# Patient Record
Sex: FEMALE | Race: WHITE | NOT HISPANIC OR LATINO
[De-identification: names, ages, dates, MRNs, and addresses within clinical notes are randomized per-mention and may not be internally consistent; named-entity substitution may affect disease eponyms.]

---

## 2019-08-12 ENCOUNTER — APPOINTMENT (OUTPATIENT)
Dept: ANTEPARTUM | Facility: CLINIC | Age: 24
End: 2019-08-12
Payer: MEDICAID

## 2019-08-12 ENCOUNTER — ASOB RESULT (OUTPATIENT)
Age: 24
End: 2019-08-12

## 2019-08-12 PROCEDURE — 76817 TRANSVAGINAL US OBSTETRIC: CPT

## 2019-08-12 PROCEDURE — 76811 OB US DETAILED SNGL FETUS: CPT

## 2019-09-01 ENCOUNTER — OUTPATIENT (OUTPATIENT)
Dept: OUTPATIENT SERVICES | Facility: HOSPITAL | Age: 24
LOS: 1 days | Discharge: HOME | End: 2019-09-01

## 2019-09-01 VITALS
HEART RATE: 75 BPM | RESPIRATION RATE: 17 BRPM | SYSTOLIC BLOOD PRESSURE: 100 MMHG | TEMPERATURE: 98 F | DIASTOLIC BLOOD PRESSURE: 59 MMHG

## 2019-09-01 VITALS — TEMPERATURE: 98 F

## 2019-09-01 LAB
APPEARANCE UR: ABNORMAL
BILIRUB UR-MCNC: NEGATIVE — SIGNIFICANT CHANGE UP
COLOR SPEC: YELLOW — SIGNIFICANT CHANGE UP
DIFF PNL FLD: SIGNIFICANT CHANGE UP
GLUCOSE UR QL: SIGNIFICANT CHANGE UP
KETONES UR-MCNC: NEGATIVE — SIGNIFICANT CHANGE UP
LEUKOCYTE ESTERASE UR-ACNC: ABNORMAL
NITRITE UR-MCNC: NEGATIVE — SIGNIFICANT CHANGE UP
PH UR: 6.5 — SIGNIFICANT CHANGE UP (ref 5–8)
PROT UR-MCNC: ABNORMAL
SP GR SPEC: 1.02 — SIGNIFICANT CHANGE UP (ref 1.01–1.02)
UROBILINOGEN FLD QL: SIGNIFICANT CHANGE UP

## 2019-09-01 NOTE — OB PROVIDER TRIAGE NOTE - NSHPPHYSICALEXAM_GEN_ALL_CORE
Vital Signs Last 24 Hrs  T(F): 98 (01 Sep 2019 21:43), Max: 98.5 (01 Sep 2019 21:35)  HR: 75 (01 Sep 2019 21:43) (75 - 75)  BP: 100/59 (01 Sep 2019 21:43) (100/59 - 100/59)  RR: 17 (01 Sep 2019 21:43) (17 - 17)    udip: trace blood, trace protein, large leuk    gen: AAOx3, nad  EFM: 140/mod maria victoria/+accel  toco: none  SVE: C/L/P  speculum: no discharge or bleeding  abd: soft, nontender, gravid, no palpable contractions, no CVA tenderness  TVUS:  cvx 3.02cm  TAUS: FHR 153bpm, breech, ant placenta, MVP 4.52cm

## 2019-09-01 NOTE — OB PROVIDER TRIAGE NOTE - NSOBPROVIDERNOTE_OBGYN_ALL_OB_FT
23 yo  @23w4d, GBS unknown, w/ likely uncomplicated UTI, not in  labor, reassuring fetal and maternal status, currently clinically and hemodynamically stable.    - d/c to home  - f/up at next scheduled appt with PMD  - return to L&D if febrile or significant low back pain  - c/w cefdinir as prescribed  - f/up UA, Ucx  -  labor precautions  - maternal hydration encouraged    Dr. Mcpherson and Dr. Ogden aware.

## 2019-09-01 NOTE — OB PROVIDER TRIAGE NOTE - HISTORY OF PRESENT ILLNESS
25 yo  @23w4d w/ EDC 19 by LMP and c/w 1st tri sono presents to L&D with c/o UTI symptoms. Pt has had urgency, frequency and dysuria for the past 2 days. Also reports mild diffuse, aching, intermittent low back pain since this time, 4/10 intensity. Nothing makes better or worse, did not take any meds. Has also had suprapubic tenderness. Pt given prescription for cefdinir today, 300mg BID [id number]. Has taken 1 dose. Denies fever, chills, CP, SOB, N/V, constipation/diarrhea, hematuria, vaginal discharge. Denies ctx, LOF, VB. Last PO pizza earlier today. Last BM 3 days ago, normal for pt. No intercourse >24hrs. No fetal movements yet. Otherwise no complications during this pregnancy. GBS unknown

## 2019-09-01 NOTE — OB RN TRIAGE NOTE - NSNURSINGINSTR_OBGYN_ALL_OB_FT
pt educated on pre term labor instructions; instructed to completely finish antibiotics she is currently taking at home; verbalized understanding

## 2019-09-01 NOTE — OB PROVIDER TRIAGE NOTE - ADDITIONAL INSTRUCTIONS
- f/up at next scheduled appt with PMD  - return to L&D if febrile, significant low back pain  - c/w cefdinir as prescribed  -  labor precautions  - maternal hydration encouraged

## 2019-09-01 NOTE — OB PROVIDER TRIAGE NOTE - NSHPLABSRESULTS_GEN_ALL_CORE
Labs  5/7/19  HepB: NR  RPR: NR  measles: non-immune  varicella: immune  rubella: non-immune  mumps: non-immune  blood type: O pos  Ab screen: neg  HIV: NR  Ucx: neg    Sonos:  no PNC

## 2019-09-02 LAB
BACTERIA # UR AUTO: ABNORMAL
EPI CELLS # UR: 6 /HPF — HIGH (ref 0–5)
HYALINE CASTS # UR AUTO: 2 /LPF — SIGNIFICANT CHANGE UP (ref 0–7)
RBC CASTS # UR COMP ASSIST: 6 /HPF — HIGH (ref 0–4)
WBC UR QL: 409 /HPF — HIGH (ref 0–5)

## 2019-09-03 LAB
CULTURE RESULTS: NO GROWTH — SIGNIFICANT CHANGE UP
SPECIMEN SOURCE: SIGNIFICANT CHANGE UP

## 2019-10-23 ENCOUNTER — ASOB RESULT (OUTPATIENT)
Age: 24
End: 2019-10-23

## 2019-10-23 ENCOUNTER — APPOINTMENT (OUTPATIENT)
Dept: ANTEPARTUM | Facility: CLINIC | Age: 24
End: 2019-10-23
Payer: MEDICAID

## 2019-10-23 PROCEDURE — 76816 OB US FOLLOW-UP PER FETUS: CPT

## 2019-11-25 ENCOUNTER — ASOB RESULT (OUTPATIENT)
Age: 24
End: 2019-11-25

## 2019-11-25 ENCOUNTER — APPOINTMENT (OUTPATIENT)
Dept: ANTEPARTUM | Facility: CLINIC | Age: 24
End: 2019-11-25
Payer: MEDICAID

## 2019-11-25 PROCEDURE — 76818 FETAL BIOPHYS PROFILE W/NST: CPT

## 2019-11-25 PROCEDURE — 76816 OB US FOLLOW-UP PER FETUS: CPT

## 2019-12-16 ENCOUNTER — ASOB RESULT (OUTPATIENT)
Age: 24
End: 2019-12-16

## 2019-12-16 ENCOUNTER — APPOINTMENT (OUTPATIENT)
Dept: ANTEPARTUM | Facility: CLINIC | Age: 24
End: 2019-12-16
Payer: MEDICAID

## 2019-12-16 PROCEDURE — 76816 OB US FOLLOW-UP PER FETUS: CPT

## 2019-12-21 ENCOUNTER — INPATIENT (INPATIENT)
Facility: HOSPITAL | Age: 24
LOS: 2 days | Discharge: HOME | End: 2019-12-24
Attending: OBSTETRICS & GYNECOLOGY | Admitting: OBSTETRICS & GYNECOLOGY
Payer: MEDICAID

## 2019-12-21 VITALS — TEMPERATURE: 98 F | DIASTOLIC BLOOD PRESSURE: 77 MMHG | SYSTOLIC BLOOD PRESSURE: 114 MMHG | HEART RATE: 72 BPM

## 2019-12-21 LAB
AMPHET UR-MCNC: NEGATIVE — SIGNIFICANT CHANGE UP
APPEARANCE UR: ABNORMAL
BACTERIA # UR AUTO: ABNORMAL
BARBITURATES UR SCN-MCNC: NEGATIVE — SIGNIFICANT CHANGE UP
BASOPHILS # BLD AUTO: 0.02 K/UL — SIGNIFICANT CHANGE UP (ref 0–0.2)
BASOPHILS NFR BLD AUTO: 0.2 % — SIGNIFICANT CHANGE UP (ref 0–1)
BENZODIAZ UR-MCNC: NEGATIVE — SIGNIFICANT CHANGE UP
BILIRUB UR-MCNC: NEGATIVE — SIGNIFICANT CHANGE UP
BLD GP AB SCN SERPL QL: SIGNIFICANT CHANGE UP
BUPRENORPHINE SCREEN, URINE RESULT: NEGATIVE — SIGNIFICANT CHANGE UP
COCAINE METAB.OTHER UR-MCNC: NEGATIVE — SIGNIFICANT CHANGE UP
COLOR SPEC: SIGNIFICANT CHANGE UP
DIFF PNL FLD: ABNORMAL
EOSINOPHIL # BLD AUTO: 0.16 K/UL — SIGNIFICANT CHANGE UP (ref 0–0.7)
EOSINOPHIL NFR BLD AUTO: 1.9 % — SIGNIFICANT CHANGE UP (ref 0–8)
EPI CELLS # UR: 5 /HPF — SIGNIFICANT CHANGE UP (ref 0–5)
FENTANYL UR QL: NEGATIVE — SIGNIFICANT CHANGE UP
GLUCOSE UR QL: NEGATIVE — SIGNIFICANT CHANGE UP
HCT VFR BLD CALC: 37.6 % — SIGNIFICANT CHANGE UP (ref 37–47)
HGB BLD-MCNC: 12.6 G/DL — SIGNIFICANT CHANGE UP (ref 12–16)
HYALINE CASTS # UR AUTO: 2 /LPF — SIGNIFICANT CHANGE UP (ref 0–7)
IMM GRANULOCYTES NFR BLD AUTO: 0.8 % — HIGH (ref 0.1–0.3)
KETONES UR-MCNC: NEGATIVE — SIGNIFICANT CHANGE UP
L&D DRUG SCREEN, URINE: SIGNIFICANT CHANGE UP
LEUKOCYTE ESTERASE UR-ACNC: ABNORMAL
LYMPHOCYTES # BLD AUTO: 1.26 K/UL — SIGNIFICANT CHANGE UP (ref 1.2–3.4)
LYMPHOCYTES # BLD AUTO: 14.7 % — LOW (ref 20.5–51.1)
MCHC RBC-ENTMCNC: 29.9 PG — SIGNIFICANT CHANGE UP (ref 27–31)
MCHC RBC-ENTMCNC: 33.5 G/DL — SIGNIFICANT CHANGE UP (ref 32–37)
MCV RBC AUTO: 89.1 FL — SIGNIFICANT CHANGE UP (ref 81–99)
METHADONE UR-MCNC: NEGATIVE — SIGNIFICANT CHANGE UP
MONOCYTES # BLD AUTO: 0.56 K/UL — SIGNIFICANT CHANGE UP (ref 0.1–0.6)
MONOCYTES NFR BLD AUTO: 6.5 % — SIGNIFICANT CHANGE UP (ref 1.7–9.3)
NEUTROPHILS # BLD AUTO: 6.52 K/UL — HIGH (ref 1.4–6.5)
NEUTROPHILS NFR BLD AUTO: 75.9 % — HIGH (ref 42.2–75.2)
NITRITE UR-MCNC: NEGATIVE — SIGNIFICANT CHANGE UP
NRBC # BLD: 0 /100 WBCS — SIGNIFICANT CHANGE UP (ref 0–0)
OPIATES UR-MCNC: NEGATIVE — SIGNIFICANT CHANGE UP
OXYCODONE UR-MCNC: NEGATIVE — SIGNIFICANT CHANGE UP
PCP UR-MCNC: NEGATIVE — SIGNIFICANT CHANGE UP
PH UR: 7.5 — SIGNIFICANT CHANGE UP (ref 5–8)
PLATELET # BLD AUTO: 179 K/UL — SIGNIFICANT CHANGE UP (ref 130–400)
PRENATAL SYPHILIS TEST: SIGNIFICANT CHANGE UP
PROPOXYPHENE QUALITATIVE URINE RESULT: NEGATIVE — SIGNIFICANT CHANGE UP
PROT UR-MCNC: NEGATIVE — SIGNIFICANT CHANGE UP
RBC # BLD: 4.22 M/UL — SIGNIFICANT CHANGE UP (ref 4.2–5.4)
RBC # FLD: 14.2 % — SIGNIFICANT CHANGE UP (ref 11.5–14.5)
RBC CASTS # UR COMP ASSIST: 6 /HPF — HIGH (ref 0–4)
SP GR SPEC: 1.01 — LOW (ref 1.01–1.02)
UROBILINOGEN FLD QL: SIGNIFICANT CHANGE UP
WBC # BLD: 8.59 K/UL — SIGNIFICANT CHANGE UP (ref 4.8–10.8)
WBC # FLD AUTO: 8.59 K/UL — SIGNIFICANT CHANGE UP (ref 4.8–10.8)
WBC UR QL: 34 /HPF — HIGH (ref 0–5)

## 2019-12-21 PROCEDURE — 59400 OBSTETRICAL CARE: CPT | Mod: U9

## 2019-12-21 RX ORDER — BUPIVACAINE HCL/PF 7.5 MG/ML
250 VIAL (ML) INJECTION
Refills: 0 | Status: DISCONTINUED | OUTPATIENT
Start: 2019-12-21 | End: 2019-12-21

## 2019-12-21 RX ORDER — KETOROLAC TROMETHAMINE 30 MG/ML
30 SYRINGE (ML) INJECTION ONCE
Refills: 0 | Status: DISCONTINUED | OUTPATIENT
Start: 2019-12-21 | End: 2019-12-21

## 2019-12-21 RX ORDER — SODIUM CHLORIDE 9 MG/ML
3 INJECTION INTRAMUSCULAR; INTRAVENOUS; SUBCUTANEOUS EVERY 8 HOURS
Refills: 0 | Status: DISCONTINUED | OUTPATIENT
Start: 2019-12-21 | End: 2019-12-24

## 2019-12-21 RX ORDER — OXYCODONE HYDROCHLORIDE 5 MG/1
5 TABLET ORAL ONCE
Refills: 0 | Status: DISCONTINUED | OUTPATIENT
Start: 2019-12-21 | End: 2019-12-24

## 2019-12-21 RX ORDER — GLYCERIN ADULT
1 SUPPOSITORY, RECTAL RECTAL AT BEDTIME
Refills: 0 | Status: DISCONTINUED | OUTPATIENT
Start: 2019-12-21 | End: 2019-12-24

## 2019-12-21 RX ORDER — OXYCODONE HYDROCHLORIDE 5 MG/1
5 TABLET ORAL
Refills: 0 | Status: DISCONTINUED | OUTPATIENT
Start: 2019-12-21 | End: 2019-12-24

## 2019-12-21 RX ORDER — MAGNESIUM HYDROXIDE 400 MG/1
30 TABLET, CHEWABLE ORAL
Refills: 0 | Status: DISCONTINUED | OUTPATIENT
Start: 2019-12-21 | End: 2019-12-24

## 2019-12-21 RX ORDER — NALOXONE HYDROCHLORIDE 4 MG/.1ML
0.1 SPRAY NASAL
Refills: 0 | Status: DISCONTINUED | OUTPATIENT
Start: 2019-12-21 | End: 2019-12-21

## 2019-12-21 RX ORDER — OXYTOCIN 10 UNIT/ML
333.33 VIAL (ML) INJECTION
Qty: 20 | Refills: 0 | Status: DISCONTINUED | OUTPATIENT
Start: 2019-12-21 | End: 2019-12-24

## 2019-12-21 RX ORDER — OXYTOCIN 10 UNIT/ML
333.33 VIAL (ML) INJECTION
Qty: 20 | Refills: 0 | Status: DISCONTINUED | OUTPATIENT
Start: 2019-12-21 | End: 2019-12-21

## 2019-12-21 RX ORDER — DIPHENHYDRAMINE HCL 50 MG
25 CAPSULE ORAL EVERY 6 HOURS
Refills: 0 | Status: DISCONTINUED | OUTPATIENT
Start: 2019-12-21 | End: 2019-12-24

## 2019-12-21 RX ORDER — ONDANSETRON 8 MG/1
4 TABLET, FILM COATED ORAL EVERY 6 HOURS
Refills: 0 | Status: DISCONTINUED | OUTPATIENT
Start: 2019-12-21 | End: 2019-12-21

## 2019-12-21 RX ORDER — AER TRAVELER 0.5 G/1
1 SOLUTION RECTAL; TOPICAL EVERY 4 HOURS
Refills: 0 | Status: DISCONTINUED | OUTPATIENT
Start: 2019-12-21 | End: 2019-12-24

## 2019-12-21 RX ORDER — ACETAMINOPHEN 500 MG
975 TABLET ORAL
Refills: 0 | Status: DISCONTINUED | OUTPATIENT
Start: 2019-12-21 | End: 2019-12-24

## 2019-12-21 RX ORDER — DIBUCAINE 1 %
1 OINTMENT (GRAM) RECTAL EVERY 6 HOURS
Refills: 0 | Status: DISCONTINUED | OUTPATIENT
Start: 2019-12-21 | End: 2019-12-24

## 2019-12-21 RX ORDER — BENZOCAINE 10 %
1 GEL (GRAM) MUCOUS MEMBRANE EVERY 6 HOURS
Refills: 0 | Status: DISCONTINUED | OUTPATIENT
Start: 2019-12-21 | End: 2019-12-24

## 2019-12-21 RX ORDER — CITRIC ACID/SODIUM CITRATE 300-500 MG
15 SOLUTION, ORAL ORAL EVERY 6 HOURS
Refills: 0 | Status: DISCONTINUED | OUTPATIENT
Start: 2019-12-21 | End: 2019-12-21

## 2019-12-21 RX ORDER — IBUPROFEN 200 MG
600 TABLET ORAL EVERY 6 HOURS
Refills: 0 | Status: COMPLETED | OUTPATIENT
Start: 2019-12-21 | End: 2020-11-18

## 2019-12-21 RX ORDER — SODIUM CHLORIDE 9 MG/ML
1000 INJECTION, SOLUTION INTRAVENOUS
Refills: 0 | Status: DISCONTINUED | OUTPATIENT
Start: 2019-12-21 | End: 2019-12-21

## 2019-12-21 RX ORDER — PRAMOXINE HYDROCHLORIDE 150 MG/15G
1 AEROSOL, FOAM RECTAL EVERY 4 HOURS
Refills: 0 | Status: DISCONTINUED | OUTPATIENT
Start: 2019-12-21 | End: 2019-12-24

## 2019-12-21 RX ORDER — HYDROCORTISONE 1 %
1 OINTMENT (GRAM) TOPICAL EVERY 6 HOURS
Refills: 0 | Status: DISCONTINUED | OUTPATIENT
Start: 2019-12-21 | End: 2019-12-24

## 2019-12-21 RX ORDER — SIMETHICONE 80 MG/1
80 TABLET, CHEWABLE ORAL EVERY 4 HOURS
Refills: 0 | Status: DISCONTINUED | OUTPATIENT
Start: 2019-12-21 | End: 2019-12-24

## 2019-12-21 RX ORDER — DEXAMETHASONE 0.5 MG/5ML
4 ELIXIR ORAL EVERY 6 HOURS
Refills: 0 | Status: DISCONTINUED | OUTPATIENT
Start: 2019-12-21 | End: 2019-12-21

## 2019-12-21 RX ORDER — LANOLIN
1 OINTMENT (GRAM) TOPICAL EVERY 6 HOURS
Refills: 0 | Status: DISCONTINUED | OUTPATIENT
Start: 2019-12-21 | End: 2019-12-24

## 2019-12-21 RX ADMIN — Medication 30 MILLIGRAM(S): at 23:43

## 2019-12-21 NOTE — OB PROVIDER H&P - ALERT: PERTINENT HISTORY
1st Trimester Sonogram/20 Week Level II Sonogram/BioPhysical Profile(s)/Follow up Sonogram for Growth

## 2019-12-21 NOTE — OB RN PATIENT PROFILE - ALERT: PERTINENT HISTORY
20 Week Level II Sonogram/1st Trimester Sonogram/BioPhysical Profile(s)/Follow up Sonogram for Growth

## 2019-12-21 NOTE — PROGRESS NOTE ADULT - ASSESSMENT
A/P:  25yo  @39w3d, GBS neg, IUGR, with FH of provoked DVT, clotting profile wnl, with anemia on PO iron, MMR nonimmune, in labor, s/p AROM, with cat II tracing  -cont EFM/toco  -clear liquid diet, IVF  -continue resuscitation with O2, IVF bolus, LLP   -pain management with epidural  -vitals per routine      Dr. Mckee and Dr. White aware.

## 2019-12-21 NOTE — PROGRESS NOTE ADULT - ASSESSMENT
A/P:  23yo  @39w3d, GBS neg, IUGR, with FH of provoked DVT, clotting profile wnl, with anemia on PO iron, MMR nonimmune, in labor, s/p epidural, s/p AROM, patient pushing  -cont EFM/toco  -clear liquid diet, IVF  -continue resuscitation with O2, IVF bolus, LLP prn  -pain management with epidural  -vitals per routine  -anticipate vaginal delivery      Dr. Mckee and Dr. White aware.

## 2019-12-21 NOTE — PROGRESS NOTE ADULT - SUBJECTIVE AND OBJECTIVE BOX
PGY 1 Note    Patient seen at bedside for evaluation of labor progression.  Feels pressure, currently pushing but feeling tired. With intermittent variable decelerations, down to 60bpm, recovering spontaneously. Denies headache, changes in vision, chest pain, SOB, RUQ/epigastric pain, N/V, fevers, chills, diarrhea, LE pain/swelling.     Vital Signs Last 24 Hrs  T(C): 36.5 (21 Dec 2019 13:08), Max: 36.5 (21 Dec 2019 12:43)  T(F): 97.7 (21 Dec 2019 13:08), Max: 97.7 (21 Dec 2019 12:43)  HR: 83 (21 Dec 2019 21:52) (62 - 111)  BP: 115/57 (21 Dec 2019 21:30) (98/57 - 162/117) 162/117 (while pushing) --> 115/57  RR: 18 (21 Dec 2019 13:08) (18 - 18)  SpO2: 99% (21 Dec 2019 21:52) (82% - 100%)    EFM: 140/mod/+accel/+intermittent variable, down to 60bpm, cat II  TOCO: q2m  SVE: 10/100/+1    Labs:                        12.6   8.59  )-----------( 179      ( 21 Dec 2019 13:10 )             37.6           ABO RH Interpretation: O POS (19 @ 13:10)  antibody neg  RPR NR  UDS neg    Urinalysis Basic - ( 21 Dec 2019 13:35 )    Color: Light Yellow / Appearance: Slightly Turbid / S.007 / pH: x  Gluc: x / Ketone: Negative  / Bili: Negative / Urobili: <2 mg/dL   Blood: x / Protein: Negative / Nitrite: Negative   Leuk Esterase: Moderate / RBC: 6 /HPF / WBC 34 /HPF   Sq Epi: x / Non Sq Epi: 5 /HPF / Bacteria: Few          Meds: BUpivacaine 0.1% in 0.9% Sodium Chloride PCEA 250 milliLiter(s) Epidural PCA Continuous, started @1352  citric acid/sodium citrate Solution 15 milliLiter(s) Oral every 6 hours

## 2019-12-21 NOTE — PROGRESS NOTE ADULT - SUBJECTIVE AND OBJECTIVE BOX
PGY 1 Note    Patient seen at bedside for evaluation of labor progression. Comfortable s/p epidural.  AROM clear @1412.    Vital Signs Last 24 Hrs  T(C): 36.5 (21 Dec 2019 13:08), Max: 36.5 (21 Dec 2019 12:43)  T(F): 97.7 (21 Dec 2019 13:08), Max: 97.7 (21 Dec 2019 12:43)  HR: 80 (21 Dec 2019 14:57) (64 - 101)  BP: 101/65 (21 Dec 2019 14:57) (101/65 - 122/79)  RR: 18 (21 Dec 2019 13:08) (18 - 18)  SpO2: 100% (21 Dec 2019 15:01) (99% - 100%)    EFM: 120/mod/+accel, cat I  TOCO: q2m  SVE: 590/-1 @1412 per Dr. White    Labs:                        12.6   8.59  )-----------( 179      ( 21 Dec 2019 13:10 )             37.6         ABO RH Interpretation: O POS (19 @ 13:10)  antibody neg    Urinalysis Basic - ( 21 Dec 2019 13:35 )    Color: Light Yellow / Appearance: Slightly Turbid / S.007 / pH: x  Gluc: x / Ketone: Negative  / Bili: Negative / Urobili: <2 mg/dL   Blood: x / Protein: Negative / Nitrite: Negative   Leuk Esterase: Moderate / RBC: 6 /HPF / WBC 34 /HPF   Sq Epi: x / Non Sq Epi: 5 /HPF / Bacteria: Few      Meds: BUpivacaine 0.1% in 0.9% Sodium Chloride PCEA 250 milliLiter(s) Epidural PCA Continuous, @1352  citric acid/sodium citrate Solution 15 milliLiter(s) Oral every 6 hours

## 2019-12-21 NOTE — PROGRESS NOTE ADULT - SUBJECTIVE AND OBJECTIVE BOX
PGY 1 Note    Patient seen at bedside for evaluation of labor progression. Comfortable s/p epidural.  EFM with late decel x3, longest lasting 3 min, down to 60bpm, recovering spontaneously.  Resuscitated with LLP, IV fluid bolus, O2 therapy and scalp stimulation.  With intermittent variable decelerations, down to 90bpm, recovering spontaneously.  Patient denies any complaints at this time.    Vital Signs Last 24 Hrs  T(C): 36.5 (21 Dec 2019 13:08), Max: 36.5 (21 Dec 2019 12:43)  T(F): 97.7 (21 Dec 2019 13:08), Max: 97.7 (21 Dec 2019 12:43)  HR: 78 (21 Dec 2019 18:21) (62 - 108)  BP: 130/68 (21 Dec 2019 18:14) (98/57 - 130/68)  RR: 18 (21 Dec 2019 13:08) (18 - 18)  SpO2: 100% (21 Dec 2019 18:21) (82% - 100%)    EFM: 120/mod/+accel/+late decel x3/+intermittent variable decels, cat II  TOCO: q2m  SVE: 7/100/0 @1813    Labs:                        12.6   8.59  )-----------( 179      ( 21 Dec 2019 13:10 )             37.6           ABO RH Interpretation: O POS (19 @ 13:10)  antibody neg  RPR NR  UDS neg    Urinalysis Basic - ( 21 Dec 2019 13:35 )    Color: Light Yellow / Appearance: Slightly Turbid / S.007 / pH: x  Gluc: x / Ketone: Negative  / Bili: Negative / Urobili: <2 mg/dL   Blood: x / Protein: Negative / Nitrite: Negative   Leuk Esterase: Moderate / RBC: 6 /HPF / WBC 34 /HPF   Sq Epi: x / Non Sq Epi: 5 /HPF / Bacteria: Few      Meds: BUpivacaine 0.1% in 0.9% Sodium Chloride PCEA 250 milliLiter(s) Epidural PCA Continuous, started @1352  citric acid/sodium citrate Solution 15 milliLiter(s) Oral every 6 hours

## 2019-12-21 NOTE — PROGRESS NOTE ADULT - ASSESSMENT
A/P:  25yo  @39w3d, GBS neg, IUGR, with FH of provoked DVT, clotting profile wnl, with anemia on PO iron, MMR nonimmune, in labor, s/p AROM   -cont efm/toco  -clear liquid diet, IVF  -monitor vitals per routine  -MMR postpartum  -f/u UDS    Dr. Mckee and Dr. White aware.

## 2019-12-21 NOTE — PROGRESS NOTE ADULT - ASSESSMENT
A/P:  25yo  @39w3d, GBS neg, IUGR, with FH of provoked DVT, clotting profile wnl, with anemia on PO iron, MMR nonimmune, in labor, s/p AROM   -cont EFM/toco  -clear liquid diet, IVF  -pain management with epidural  -f/u UDS    Dr. Mckee and Dr. White aware.

## 2019-12-21 NOTE — OB PROVIDER H&P - NSHPLABSRESULTS_GEN_ALL_CORE
Labs:  5/7  measles nonimmune  mumps nonimmune  Lead <1  varicella immune    6/4  cardiolipin IgA, IgG, IgM neg  factor V leidun neg  prothrombin neg    10/10      10/24  GTT 71/107/71/90    Sono:  8/12: post placenta, normal anatomy, AGA  10/10: 29w1d, size <dates, EFW 1181g (11%), MVP 3.8cm  10/23: 31w0d, EFW 18%, ACT at (11%), MVP 6.7cm, BPP 8/8, fetal spine wnl  11/25: 35w5d, EFW <10%, BPP 8/8, NST reative, MVP 3cm   12/03: 36w6d, vtx, BPP 8/8, MVP 4.5cm, NST reactive   12/5: 37w1d, size < dates, MVP 3.8cm, BPP 8/8, NST reactive   12/9: 37w5d, BPP 10/10, MVP 3.cm

## 2019-12-21 NOTE — OB PROVIDER H&P - ASSESSMENT
22yo  @39w3d, GBS neg, IUGR, with FH of provoked DVT, clotting profile wnl, with anemia on PO iron, MMR nonimmune, in labor  -admit to L&D  -cont efm/toco  -clear liquid diet, IVF  -monitor vitals per routine  -f/u admission labs  -MMR postpartum    Dr. Mckee and Dr. White aware.

## 2019-12-21 NOTE — PROGRESS NOTE ADULT - SUBJECTIVE AND OBJECTIVE BOX
PGY 1 Note    Patient seen at bedside for evaluation of labor progression. Feels constant pressure and currently pushing.    Vital Signs Last 24 Hrs  T(C): 36.5 (21 Dec 2019 13:08), Max: 36.5 (21 Dec 2019 12:43)  T(F): 97.7 (21 Dec 2019 13:08), Max: 97.7 (21 Dec 2019 12:43)  HR: 88 (21 Dec 2019 20:21) (62 - 108)  BP: 108/66 (21 Dec 2019 20:13) (98/57 - 130/68)  RR: 18 (21 Dec 2019 13:08) (18 - 18)  SpO2: 96% (21 Dec 2019 20:21) (82% - 100%)    EFM: 130/mod/+accel, cat I, with loss of contact during contractions  TOCO: q2m  SVE: 10/100/+1    Labs:                        12.6   8.59  )-----------( 179      ( 21 Dec 2019 13:10 )             37.6           ABO RH Interpretation: O POS (19 @ 13:10)  antibody neg  UDS neg    Urinalysis Basic - ( 21 Dec 2019 13:35 )    Color: Light Yellow / Appearance: Slightly Turbid / S.007 / pH: x  Gluc: x / Ketone: Negative  / Bili: Negative / Urobili: <2 mg/dL   Blood: x / Protein: Negative / Nitrite: Negative   Leuk Esterase: Moderate / RBC: 6 /HPF / WBC 34 /HPF   Sq Epi: x / Non Sq Epi: 5 /HPF / Bacteria: Few          Meds: BUpivacaine 0.1% in 0.9% Sodium Chloride PCEA 250 milliLiter(s) Epidural PCA Continuous, started @1352  citric acid/sodium citrate Solution 15 milliLiter(s) Oral every 6 hours

## 2019-12-21 NOTE — OB PROVIDER DELIVERY SUMMARY - NSPROVIDERDELIVERYNOTE_OBGYN_ALL_OB_FT
pt delivered a viable female infant over midline episiotomy  placenta delivered intact and spont   pt stable mild lochia   uterus firm   baby to reg nursery  pt stable   episiotomy repaired with 2-0 chromic suture in a usual fashion  pt stable.

## 2019-12-21 NOTE — PROGRESS NOTE ADULT - ASSESSMENT
A/P:  25yo  @39w3d, GBS neg, IUGR, with FH of provoked DVT, clotting profile wnl, with anemia on PO iron, MMR nonimmune, in labor, s/p AROM, patient pushing  -cont EFM/toco  -clear liquid diet, IVF  -continue resuscitation with O2, IVF bolus, LLP prn  -pain management with epidural  -vitals per routine  -anticipate vaginal delivery      Dr. Mckee and Dr. White aware.

## 2019-12-21 NOTE — OB PROVIDER H&P - FAMILY HISTORY
Mother  Still living? Unknown  Family history of hypertension, Age at diagnosis: Age Unknown  Family history of DVT, Age at diagnosis: Age Unknown

## 2019-12-21 NOTE — PROGRESS NOTE ADULT - SUBJECTIVE AND OBJECTIVE BOX
PGY 1 Note    Patient seen at bedside for evaluation of labor progression. Comfortable s/p epidural.    Vital Signs Last 24 Hrs  T(C): 36.5 (21 Dec 2019 13:08), Max: 36.5 (21 Dec 2019 12:43)  T(F): 97.7 (21 Dec 2019 13:08), Max: 97.7 (21 Dec 2019 12:43)  HR: 68 (21 Dec 2019 15:57) (62 - 101)  BP: 110/73 (21 Dec 2019 15:43) (101/65 - 122/79)  RR: 18 (21 Dec 2019 13:08) (18 - 18)  SpO2: 89% (21 Dec 2019 15:57) (89% - 100%)    EFM: 115/mod/+accel, cat I  TOCO: q2m  SVE: 5/-1 @1755 per Dr. White, ruptured    Labs:                        12.6   8.59  )-----------( 179      ( 21 Dec 2019 13:10 )             37.6           ABO RH Interpretation: O POS (19 @ 13:10)  antibody neg  RPR NR    Urinalysis Basic - ( 21 Dec 2019 13:35 )    Color: Light Yellow / Appearance: Slightly Turbid / S.007 / pH: x  Gluc: x / Ketone: Negative  / Bili: Negative / Urobili: <2 mg/dL   Blood: x / Protein: Negative / Nitrite: Negative   Leuk Esterase: Moderate / RBC: 6 /HPF / WBC 34 /HPF   Sq Epi: x / Non Sq Epi: 5 /HPF / Bacteria: Few          Meds: BUpivacaine 0.1% in 0.9% Sodium Chloride PCEA 250 milliLiter(s) Epidural PCA Continuous, started @1352  citric acid/sodium citrate Solution 15 milliLiter(s) Oral every 6 hours

## 2019-12-21 NOTE — OB PROVIDER H&P - NSHPPHYSICALEXAM_GEN_ALL_CORE
Vital Signs Last 24 Hrs  T(C): 36.5 (21 Dec 2019 12:44), Max: 36.5 (21 Dec 2019 12:43)  T(F): 97.7 (21 Dec 2019 12:44), Max: 97.7 (21 Dec 2019 12:43)  HR: 101 (21 Dec 2019 13:00) (72 - 101)  BP: 122/79 (21 Dec 2019 13:00) (114/77 - 122/79)  RR: 18 (21 Dec 2019 12:44) (18 - 18)    Gen: NAD, sitting comfortably  Abd: Gravid, soft, NT, palpable ctx  SVE: 7/100/-1, vtx, intact with bulging membranes  EFM: 125/mod/+accels, cat I  [place]: q2m Vital Signs Last 24 Hrs  T(C): 36.5 (21 Dec 2019 12:44), Max: 36.5 (21 Dec 2019 12:43)  T(F): 97.7 (21 Dec 2019 12:44), Max: 97.7 (21 Dec 2019 12:43)  HR: 101 (21 Dec 2019 13:00) (72 - 101)  BP: 122/79 (21 Dec 2019 13:00) (114/77 - 122/79)  RR: 18 (21 Dec 2019 12:44) (18 - 18)    Gen: NAD, sitting comfortably  Abd: Gravid, soft, NT, palpable ctx  SVE: 7/100/-1, vtx, intact with bulging membranes  EFM: 125/mod/+accels, cat I  [place]: q2m  EFW by Leopold's: 2900g

## 2019-12-22 ENCOUNTER — TRANSCRIPTION ENCOUNTER (OUTPATIENT)
Age: 24
End: 2019-12-22

## 2019-12-22 ENCOUNTER — RESULT REVIEW (OUTPATIENT)
Age: 24
End: 2019-12-22

## 2019-12-22 LAB
HCT VFR BLD CALC: 34.1 % — LOW (ref 37–47)
HGB BLD-MCNC: 11 G/DL — LOW (ref 12–16)
MCHC RBC-ENTMCNC: 29.4 PG — SIGNIFICANT CHANGE UP (ref 27–31)
MCHC RBC-ENTMCNC: 32.3 G/DL — SIGNIFICANT CHANGE UP (ref 32–37)
MCV RBC AUTO: 91.2 FL — SIGNIFICANT CHANGE UP (ref 81–99)
NRBC # BLD: 0 /100 WBCS — SIGNIFICANT CHANGE UP (ref 0–0)
PLATELET # BLD AUTO: 149 K/UL — SIGNIFICANT CHANGE UP (ref 130–400)
RBC # BLD: 3.74 M/UL — LOW (ref 4.2–5.4)
RBC # FLD: 14.4 % — SIGNIFICANT CHANGE UP (ref 11.5–14.5)
WBC # BLD: 10 K/UL — SIGNIFICANT CHANGE UP (ref 4.8–10.8)
WBC # FLD AUTO: 10 K/UL — SIGNIFICANT CHANGE UP (ref 4.8–10.8)

## 2019-12-22 PROCEDURE — 88307 TISSUE EXAM BY PATHOLOGIST: CPT | Mod: 26

## 2019-12-22 RX ORDER — IBUPROFEN 200 MG
600 TABLET ORAL EVERY 6 HOURS
Refills: 0 | Status: DISCONTINUED | OUTPATIENT
Start: 2019-12-22 | End: 2019-12-24

## 2019-12-22 RX ADMIN — Medication 600 MILLIGRAM(S): at 23:37

## 2019-12-22 RX ADMIN — SODIUM CHLORIDE 3 MILLILITER(S): 9 INJECTION INTRAMUSCULAR; INTRAVENOUS; SUBCUTANEOUS at 05:19

## 2019-12-22 RX ADMIN — Medication 975 MILLIGRAM(S): at 17:35

## 2019-12-22 RX ADMIN — Medication 600 MILLIGRAM(S): at 05:19

## 2019-12-22 RX ADMIN — SODIUM CHLORIDE 3 MILLILITER(S): 9 INJECTION INTRAMUSCULAR; INTRAVENOUS; SUBCUTANEOUS at 15:49

## 2019-12-22 RX ADMIN — Medication 975 MILLIGRAM(S): at 21:14

## 2019-12-22 RX ADMIN — Medication 975 MILLIGRAM(S): at 15:51

## 2019-12-22 RX ADMIN — Medication 600 MILLIGRAM(S): at 18:51

## 2019-12-22 RX ADMIN — MAGNESIUM HYDROXIDE 30 MILLILITER(S): 400 TABLET, CHEWABLE ORAL at 23:37

## 2019-12-22 RX ADMIN — Medication 975 MILLIGRAM(S): at 09:02

## 2019-12-22 RX ADMIN — Medication 600 MILLIGRAM(S): at 19:00

## 2019-12-22 RX ADMIN — Medication 975 MILLIGRAM(S): at 20:46

## 2019-12-22 RX ADMIN — Medication 600 MILLIGRAM(S): at 11:45

## 2019-12-22 RX ADMIN — Medication 1 TABLET(S): at 11:44

## 2019-12-22 NOTE — PROGRESS NOTE ADULT - SUBJECTIVE AND OBJECTIVE BOX
OB attending  PPD #1    Pt doing well, pain well controlled. No overnight events, no acute complaints.    Ambulating: Yes  Voiding: Yes  Flatus: Yes  Bowel movements: Yes   Breast or bottle feeding: Breastfeeding  Diet: Regular    PAST MEDICAL & SURGICAL HISTORY:  Anemia  No significant past surgical history      Physical Exam  Vital Signs Last 24 Hrs  T(C): 35.9 (22 Dec 2019 08:25), Max: 36.5 (21 Dec 2019 12:43)  T(F): 96.7 (22 Dec 2019 08:25), Max: 97.7 (21 Dec 2019 12:43)  HR: 83 (22 Dec 2019 08:25) (60 - 111)  BP: 111/66 (22 Dec 2019 08:25) (98/57 - 162/117)  BP(mean): --  RR: 18 (22 Dec 2019 08:25) (18 - 18)  SpO2: 97% (21 Dec 2019 22:57) (82% - 100%)  Gen: AAOx3, NAD  Abd: Soft, nontender, nondistended, BS+  Fundus: Firm, below umbilicus  Lochia: normal  Ext: No calf tenderness, no swelling    Labs:                        12.6   8.59  )-----------( 179      ( 21 Dec 2019 13:10 )             37.6         A/P: s/p , PPD #1, doing well  - continue current management

## 2019-12-23 RX ORDER — IBUPROFEN 200 MG
1 TABLET ORAL
Qty: 0 | Refills: 0 | DISCHARGE
Start: 2019-12-23

## 2019-12-23 RX ORDER — ACETAMINOPHEN 500 MG
3 TABLET ORAL
Qty: 0 | Refills: 0 | DISCHARGE
Start: 2019-12-23

## 2019-12-23 RX ADMIN — Medication 975 MILLIGRAM(S): at 07:35

## 2019-12-23 RX ADMIN — Medication 0.5 MILLILITER(S): at 06:38

## 2019-12-23 RX ADMIN — Medication 975 MILLIGRAM(S): at 21:40

## 2019-12-23 RX ADMIN — Medication 600 MILLIGRAM(S): at 06:35

## 2019-12-23 RX ADMIN — Medication 600 MILLIGRAM(S): at 11:16

## 2019-12-23 RX ADMIN — Medication 975 MILLIGRAM(S): at 14:58

## 2019-12-23 RX ADMIN — Medication 1 TABLET(S): at 11:16

## 2019-12-23 RX ADMIN — Medication 600 MILLIGRAM(S): at 19:03

## 2019-12-23 NOTE — DISCHARGE NOTE OB - ADDITIONAL INSTRUCTIONS
If you expirence any of the following, please notify your provider:  -fever >100.4F  -increased vaginal bleeding or clotting (saturating a pad an hour)  -foul smelling discharge or bloody discharge from your incision site  -severe abdominal, vaginal, or rectal pain   -persistent headache or vision changes  -swollen areas on your legs that are red, hot, or painful   -swollen, hot, painful areas and/or streaks on your breasts  -cracked or bleeding nipples  -mood swings, depression, or crying spells lasting more than 3 days     Please schedule an appointment to see your physician in 6 weeks for a postpartum visit

## 2019-12-23 NOTE — DISCHARGE NOTE OB - CARE PROVIDER_API CALL
Jasper White)  Obstetrics and Gynecology  [address] [place] [address]  Phone: [phone]  Fax: [phone]  Follow Up Time: Routine

## 2019-12-23 NOTE — DISCHARGE NOTE OB - PATIENT PORTAL LINK FT
You can access the FollowMyHealth Patient Portal offered by [facility] by registering at the following website: [email or web address]. By joining [facility]’s FollowMyHealth portal, you will also be able to view your health information using other applications (apps) compatible with our system.

## 2019-12-23 NOTE — PROGRESS NOTE ADULT - SUBJECTIVE AND OBJECTIVE BOX
OB attending  PPD #2    Pt doing well, pain well controlled. No overnight events, no acute complaints.  difficulty ambulation , no bm     Ambulating: Yes  Voiding: Yes  Flatus: Yes  Bowel movements: Yes   Breast or bottle feeding: Breastfeeding  Diet: Regular    PAST MEDICAL & SURGICAL HISTORY:  Anemia  No significant past surgical history      Physical Exam  Vital Signs Last 24 Hrs  T(C): 36 (23 Dec 2019 11:10), Max: 36.7 (23 Dec 2019 07:50)  T(F): 96.8 (23 Dec 2019 11:10), Max: 98.1 (23 Dec 2019 07:50)  HR: 66 (23 Dec 2019 11:10) (58 - 67)  BP: 107/55 (23 Dec 2019 11:10) (103/55 - 111/69)  BP(mean): --  RR: 18 (23 Dec 2019 11:10) (18 - 18)  SpO2: --  Gen: AAOx3, NAD  Abd: Soft, nontender, nondistended, BS+  Fundus: Firm, below umbilicus  Lochia: normal  Ext: No calf tenderness, no swelling    Labs:                        11.0   10.00 )-----------( 149      ( 22 Dec 2019 12:03 )             34.1         A/P: s/p , PPD #2 doing well  - continue current management  encourage ambulation   stool softeners, promotility

## 2019-12-23 NOTE — DISCHARGE NOTE OB - VISION (WITH CORRECTIVE LENSES IF THE PATIENT USUALLY WEARS THEM):
Alternate tylenol / ibuprofen every 4 hours as needed for fevers or discomfort  Use flonase daily  Use throat lozenges for throat discomfort  Salt water gargles 3 times daily  Intake 3 liters of water daily   Rest  Eat small meals throughout the day  Apply a warm compress to the sinuses for comfort measures  Take a steam showers to loosen up mucous  Use Delsym OTC for cough  Return if any worsening symptoms        Patient Education     Sinusitis (Antibiotic Treatment)    The sinuses are air-filled spaces within the bones of the face. They connect to the inside of the nose. Sinusitis is an inflammation of the tissue that lines the sinuses. Sinusitis can occur during a cold. It can also happen due to allergies to pollens and other particles in the air. Sinusitis can cause symptoms of sinus congestion and a feeling of fullness. A sinus infection causes fever, headache, and facial pain. There is often green or yellow fluid draining from the nose or into the back of the throat (post-nasal drip). You have been given antibiotics to treat this condition.  Home care  · Take the full course of antibiotics as instructed. Do not stop taking them, even when you feel better.  · Drink plenty of water, hot tea, and other liquids. This may help thin nasal mucus. It also may help your sinuses drain fluids.  · Heat may help soothe painful areas of your face. Use a towel soaked in hot water. Or,  the shower and direct the warm spray onto your face. Using a vaporizer along with a menthol rub at night may also help soothe symptoms.   · An expectorant with guaifenesin may help thin nasal mucus and help your sinuses drain fluids.  · You can use an over-the-counter decongestant, unless a similar medicine was prescribed to you. Nasal sprays work the fastest. Use one that contains phenylephrine or oxymetazoline. First blow your nose gently. Then use the spray. Do not use these medicines more often than directed on the label. If you  do, your symptoms may get worse. You may also take pills that contain pseudoephedrine. Don’t use products that combine multiple medicines. This is because side effects may be increased. Read labels. You can also ask the pharmacist for help. (People with high blood pressure should not use decongestants. They can raise blood pressure.)  · Over-the-counter antihistamines may help if allergies contributed to your sinusitis.    · Do not use nasal rinses or irrigation during an acute sinus infection, unless your healthcare provider tells you to. Rinsing may spread the infection to other areas in your sinuses.  · Use acetaminophen or ibuprofen to control pain, unless another pain medicine was prescribed to you. If you have chronic liver or kidney disease or ever had a stomach ulcer, talk with your healthcare provider before using these medicines. (Aspirin should never be taken by anyone under age 18 who is ill with a fever. It may cause severe liver damage.)  · Don't smoke. This can make symptoms worse.  Follow-up care  Follow up with your healthcare provider or our staff if you are better in 1 week.  When to seek medical advice  Call your healthcare provider if any of these occur:  · Facial pain or headache that gets worse  · Stiff neck  · Unusual drowsiness or confusion  · Swelling of your forehead or eyelids  · Vision problems, such as blurred or double vision  · Fever of 100.4ºF (38ºC) or higher, or as directed by your healthcare provider  · Seizure  · Breathing problems  · Symptoms don't go away in 10 days  Prevention  Here are steps you can take to help prevent an infection:  · Keep good hand washing habits.  · Don’t have close contact with people who have sore throats, colds, or other upper respiratory infections.  · Don’t smoke, and stay away from secondhand smoke.  · Stay up to date with of your vaccines.  Date Last Reviewed: 11/1/2017  © 4475-5682 [employer], [employer]. [address] [facility], [place]  [address]. All rights reserved. This information is not intended as a substitute for professional medical care. Always follow your healthcare professional's instructions.           Patient Education     MEDICATION: DOXYCYCLINE  You have been prescribed an antibiotic drug known as Doxycycline (brand name: Vibramycin). It is a form of tetracycline antibiotic used to treat infections.  DIRECTIONS FOR USE:  Doxycycline may be taken with milk or food to prevent upset stomach. Do not take within 1 hour of bedtime. Take the medicine at regular intervals. If the label says “every 12 hours,” this means twice a day. Doses don’t have to be exactly 12 hours apart, but should be spread out evenly twice a day. Take all of the medicine until it is gone, even if you are feeling better. This will ensure the infection is fully treated.  WHAT TO WATCH OUR FOR:  POSSIBLE SIDE EFFECTS: Nausea, vomiting, heartburn, upper abdominal pain, diarrhea (Contact your doctor if any of these symptoms persist or become severe). White spots in the mouth (thrush), vaginal itching or discharge (Contact your doctor).  ALLERGIC REACTION: Rash, itching, swelling, trouble breathing or swallowing (Contact your doctor or return to this facility promptly).  MEDICAL CONDITIONS: Before starting this medicine, be sure your doctor knows if you have any of the following conditions:  · If you are in the last half of pregnancy or are breastfeeding  · Less than 9 years of age  · Reaction to tetracycline-type drugs in the past  · Kidney or liver disease  DRUG INTERACTION: Before starting this medicine, be sure your doctor knows if you are taking any of the following:  · Penicillin-type antibiotic, phenobarbital, birth control pill  · Coumadin (warfarin), Tegretol (carbamazepine)  WARNING:  · Sensitivity to sunlight may develop. Therefore, you should avoid the sun or use sunscreen for the next several weeks.  · Avoid alcohol when taking this medicine.  · This medicine may  become harmful when past the expiration date. Throw away any leftover pills.  · Do not take the following medicines 3 hours before or 3 hours after a dose of doxycycline:  ¨ Antacids, laxatives  ¨ Pepto-Bismol, calcium, iron supplements  [NOTE: This information topic may not include all directions, precautions, medical conditions, drug/food interactions and warnings for this drug. Check with your doctor, nurse, or pharmacist for any questions that you may have.]  © 3272-1108 [employer], [address] [place] [address], [place] [address]. All rights reserved. This information is not intended as a substitute for professional medical care. Always follow your healthcare professional's instructions.      Normal vision: sees adequately in most situations; can see medication labels, newsprint

## 2019-12-23 NOTE — DISCHARGE NOTE OB - MEDICATION SUMMARY - MEDICATIONS TO TAKE
I will START or STAY ON the medications listed below when I get home from the hospital:    ibuprofen 600 mg oral tablet  -- 1 tab(s) by mouth every 6 hours, As Needed  -- Indication: For pain    acetaminophen 325 mg oral tablet  -- 3 tab(s) by mouth every 8 hours, As Needed  -- Indication: For pain

## 2019-12-24 VITALS
DIASTOLIC BLOOD PRESSURE: 69 MMHG | HEART RATE: 80 BPM | SYSTOLIC BLOOD PRESSURE: 111 MMHG | TEMPERATURE: 97 F | RESPIRATION RATE: 18 BRPM

## 2019-12-24 RX ADMIN — Medication 600 MILLIGRAM(S): at 06:19

## 2019-12-24 RX ADMIN — Medication 600 MILLIGRAM(S): at 00:06

## 2019-12-24 RX ADMIN — Medication 975 MILLIGRAM(S): at 03:48

## 2019-12-24 NOTE — PROGRESS NOTE ADULT - SUBJECTIVE AND OBJECTIVE BOX
OB attending  PPD #3    Pt doing well, pain well controlled. No overnight events, no acute complaints.    Ambulating: Yes  Voiding: Yes  Flatus: Yes  Bowel movements: Yes   Breast or bottle feeding: Breastfeeding  Diet: Regular    PAST MEDICAL & SURGICAL HISTORY:  Anemia  No significant past surgical history      Physical Exam  Vital Signs Last 24 Hrs  T(C): 35.9 (24 Dec 2019 03:50), Max: 36.7 (23 Dec 2019 23:40)  T(F): 96.7 (24 Dec 2019 03:50), Max: 98 (23 Dec 2019 23:40)  HR: 80 (24 Dec 2019 03:50) (66 - 96)  BP: 105/66 (24 Dec 2019 03:50) (105/66 - 109/56)  BP(mean): --  RR: 16 (24 Dec 2019 03:50) (16 - 18)  SpO2: --  Gen: AAOx3, NAD  Abd: Soft, nontender, nondistended, BS+  Fundus: Firm, below umbilicus  Lochia: normal  Ext: No calf tenderness, no swelling    Labs:                        11.0   10.00 )-----------( 149      ( 22 Dec 2019 12:03 )             34.1         A/P:  s/p , PPD #3, doing well  - continue current management  d/c home

## 2019-12-29 LAB — SURGICAL PATHOLOGY STUDY: SIGNIFICANT CHANGE UP

## 2019-12-31 DIAGNOSIS — Z3A.39 39 WEEKS GESTATION OF PREGNANCY: ICD-10-CM

## 2020-01-21 NOTE — PROCEDURAL SAFETY CHECKLIST WITH OR WITHOUT SEDATION - NSINSTRUMENTCOUNTSD_GEN_ALL_CORE
LOV: 9/30/19   Last Refill:  Norco  12/20/19  #180 0 RF  Carisoprodol 12/20/19   #120 0 RF    No future appointments. not applicable

## 2020-07-12 ENCOUNTER — FORM ENCOUNTER (OUTPATIENT)
Age: 25
End: 2020-07-12

## 2020-08-09 ENCOUNTER — FORM ENCOUNTER (OUTPATIENT)
Age: 25
End: 2020-08-09

## 2020-09-16 ENCOUNTER — FORM ENCOUNTER (OUTPATIENT)
Age: 25
End: 2020-09-16

## 2020-09-17 ENCOUNTER — FORM ENCOUNTER (OUTPATIENT)
Age: 25
End: 2020-09-17

## 2020-10-25 ENCOUNTER — FORM ENCOUNTER (OUTPATIENT)
Age: 25
End: 2020-10-25

## 2020-10-26 ENCOUNTER — APPOINTMENT (OUTPATIENT)
Dept: ANTEPARTUM | Facility: CLINIC | Age: 25
End: 2020-10-26
Payer: COMMERCIAL

## 2020-10-26 ENCOUNTER — ASOB RESULT (OUTPATIENT)
Age: 25
End: 2020-10-26

## 2020-10-26 PROCEDURE — 99072 ADDL SUPL MATRL&STAF TM PHE: CPT

## 2020-10-26 PROCEDURE — 76811 OB US DETAILED SNGL FETUS: CPT

## 2020-10-28 ENCOUNTER — FORM ENCOUNTER (OUTPATIENT)
Age: 25
End: 2020-10-28

## 2020-11-08 ENCOUNTER — FORM ENCOUNTER (OUTPATIENT)
Age: 25
End: 2020-11-08

## 2020-11-09 ENCOUNTER — ASOB RESULT (OUTPATIENT)
Age: 25
End: 2020-11-09

## 2020-11-09 ENCOUNTER — APPOINTMENT (OUTPATIENT)
Dept: ANTEPARTUM | Facility: CLINIC | Age: 25
End: 2020-11-09
Payer: COMMERCIAL

## 2020-11-09 PROCEDURE — 76816 OB US FOLLOW-UP PER FETUS: CPT

## 2020-11-09 PROCEDURE — 99072 ADDL SUPL MATRL&STAF TM PHE: CPT

## 2021-01-11 ENCOUNTER — FORM ENCOUNTER (OUTPATIENT)
Age: 26
End: 2021-01-11

## 2021-01-25 NOTE — OB RN PATIENT PROFILE - PRO ANTIBODY SCREEN
GOAL: Pt will demonstrate improved static/dynamic standing balance to good, in order to improve stability, decrease fall risk and increase independence with ADLs within 2 weeks.
negative

## 2021-02-03 ENCOUNTER — FORM ENCOUNTER (OUTPATIENT)
Age: 26
End: 2021-02-03

## 2021-02-27 ENCOUNTER — INPATIENT (INPATIENT)
Facility: HOSPITAL | Age: 26
LOS: 1 days | Discharge: HOME | End: 2021-03-01
Attending: OBSTETRICS & GYNECOLOGY | Admitting: OBSTETRICS & GYNECOLOGY
Payer: COMMERCIAL

## 2021-02-27 VITALS
HEART RATE: 79 BPM | WEIGHT: 130.07 LBS | TEMPERATURE: 98 F | DIASTOLIC BLOOD PRESSURE: 68 MMHG | RESPIRATION RATE: 18 BRPM | HEIGHT: 64 IN | SYSTOLIC BLOOD PRESSURE: 116 MMHG

## 2021-02-27 LAB
APPEARANCE UR: ABNORMAL
BACTERIA # UR AUTO: NEGATIVE — SIGNIFICANT CHANGE UP
BASOPHILS # BLD AUTO: 0.01 K/UL — SIGNIFICANT CHANGE UP (ref 0–0.2)
BASOPHILS NFR BLD AUTO: 0.1 % — SIGNIFICANT CHANGE UP (ref 0–1)
BILIRUB UR-MCNC: NEGATIVE — SIGNIFICANT CHANGE UP
BLD GP AB SCN SERPL QL: SIGNIFICANT CHANGE UP
COLOR SPEC: YELLOW — SIGNIFICANT CHANGE UP
DIFF PNL FLD: NEGATIVE — SIGNIFICANT CHANGE UP
EOSINOPHIL # BLD AUTO: 0.26 K/UL — SIGNIFICANT CHANGE UP (ref 0–0.7)
EOSINOPHIL NFR BLD AUTO: 2.8 % — SIGNIFICANT CHANGE UP (ref 0–8)
EPI CELLS # UR: 5 /HPF — SIGNIFICANT CHANGE UP (ref 0–5)
GLUCOSE UR QL: NEGATIVE — SIGNIFICANT CHANGE UP
HCT VFR BLD CALC: 39.8 % — SIGNIFICANT CHANGE UP (ref 37–47)
HGB BLD-MCNC: 13.5 G/DL — SIGNIFICANT CHANGE UP (ref 12–16)
HYALINE CASTS # UR AUTO: 3 /LPF — SIGNIFICANT CHANGE UP (ref 0–7)
IMM GRANULOCYTES NFR BLD AUTO: 0.9 % — HIGH (ref 0.1–0.3)
KETONES UR-MCNC: NEGATIVE — SIGNIFICANT CHANGE UP
L&D DRUG SCREEN, URINE: SIGNIFICANT CHANGE UP
LEUKOCYTE ESTERASE UR-ACNC: ABNORMAL
LYMPHOCYTES # BLD AUTO: 1.3 K/UL — SIGNIFICANT CHANGE UP (ref 1.2–3.4)
LYMPHOCYTES # BLD AUTO: 13.8 % — LOW (ref 20.5–51.1)
MCHC RBC-ENTMCNC: 30.3 PG — SIGNIFICANT CHANGE UP (ref 27–31)
MCHC RBC-ENTMCNC: 33.9 G/DL — SIGNIFICANT CHANGE UP (ref 32–37)
MCV RBC AUTO: 89.2 FL — SIGNIFICANT CHANGE UP (ref 81–99)
MONOCYTES # BLD AUTO: 0.77 K/UL — HIGH (ref 0.1–0.6)
MONOCYTES NFR BLD AUTO: 8.2 % — SIGNIFICANT CHANGE UP (ref 1.7–9.3)
NEUTROPHILS # BLD AUTO: 6.99 K/UL — HIGH (ref 1.4–6.5)
NEUTROPHILS NFR BLD AUTO: 74.2 % — SIGNIFICANT CHANGE UP (ref 42.2–75.2)
NITRITE UR-MCNC: NEGATIVE — SIGNIFICANT CHANGE UP
NRBC # BLD: 0 /100 WBCS — SIGNIFICANT CHANGE UP (ref 0–0)
PH UR: 6.5 — SIGNIFICANT CHANGE UP (ref 5–8)
PLATELET # BLD AUTO: 195 K/UL — SIGNIFICANT CHANGE UP (ref 130–400)
PRENATAL SYPHILIS TEST: SIGNIFICANT CHANGE UP
PROT UR-MCNC: SIGNIFICANT CHANGE UP
RBC # BLD: 4.46 M/UL — SIGNIFICANT CHANGE UP (ref 4.2–5.4)
RBC # FLD: 13.3 % — SIGNIFICANT CHANGE UP (ref 11.5–14.5)
RBC CASTS # UR COMP ASSIST: 4 /HPF — SIGNIFICANT CHANGE UP (ref 0–4)
SARS-COV-2 RNA SPEC QL NAA+PROBE: SIGNIFICANT CHANGE UP
SP GR SPEC: 1.03 — SIGNIFICANT CHANGE UP (ref 1.01–1.03)
UROBILINOGEN FLD QL: SIGNIFICANT CHANGE UP
WBC # BLD: 9.41 K/UL — SIGNIFICANT CHANGE UP (ref 4.8–10.8)
WBC # FLD AUTO: 9.41 K/UL — SIGNIFICANT CHANGE UP (ref 4.8–10.8)
WBC UR QL: 11 /HPF — HIGH (ref 0–5)

## 2021-02-27 PROCEDURE — 59400 OBSTETRICAL CARE: CPT | Mod: U9

## 2021-02-27 RX ORDER — SIMETHICONE 80 MG/1
80 TABLET, CHEWABLE ORAL EVERY 4 HOURS
Refills: 0 | Status: DISCONTINUED | OUTPATIENT
Start: 2021-02-27 | End: 2021-03-01

## 2021-02-27 RX ORDER — HYDROCORTISONE 1 %
1 OINTMENT (GRAM) TOPICAL EVERY 6 HOURS
Refills: 0 | Status: DISCONTINUED | OUTPATIENT
Start: 2021-02-27 | End: 2021-03-01

## 2021-02-27 RX ORDER — OXYCODONE HYDROCHLORIDE 5 MG/1
5 TABLET ORAL
Refills: 0 | Status: DISCONTINUED | OUTPATIENT
Start: 2021-02-27 | End: 2021-03-01

## 2021-02-27 RX ORDER — AER TRAVELER 0.5 G/1
1 SOLUTION RECTAL; TOPICAL EVERY 4 HOURS
Refills: 0 | Status: DISCONTINUED | OUTPATIENT
Start: 2021-02-27 | End: 2021-03-01

## 2021-02-27 RX ORDER — OXYCODONE HYDROCHLORIDE 5 MG/1
5 TABLET ORAL ONCE
Refills: 0 | Status: DISCONTINUED | OUTPATIENT
Start: 2021-02-27 | End: 2021-03-01

## 2021-02-27 RX ORDER — DIPHENHYDRAMINE HCL 50 MG
25 CAPSULE ORAL EVERY 6 HOURS
Refills: 0 | Status: DISCONTINUED | OUTPATIENT
Start: 2021-02-27 | End: 2021-03-01

## 2021-02-27 RX ORDER — OXYTOCIN 10 UNIT/ML
333.33 VIAL (ML) INJECTION
Qty: 20 | Refills: 0 | Status: DISCONTINUED | OUTPATIENT
Start: 2021-02-27 | End: 2021-03-01

## 2021-02-27 RX ORDER — PRAMOXINE HYDROCHLORIDE 150 MG/15G
1 AEROSOL, FOAM RECTAL EVERY 4 HOURS
Refills: 0 | Status: DISCONTINUED | OUTPATIENT
Start: 2021-02-27 | End: 2021-03-01

## 2021-02-27 RX ORDER — SODIUM CHLORIDE 9 MG/ML
1000 INJECTION, SOLUTION INTRAVENOUS
Refills: 0 | Status: DISCONTINUED | OUTPATIENT
Start: 2021-02-27 | End: 2021-02-27

## 2021-02-27 RX ORDER — OXYTOCIN 10 UNIT/ML
333.33 VIAL (ML) INJECTION
Qty: 20 | Refills: 0 | Status: DISCONTINUED | OUTPATIENT
Start: 2021-02-27 | End: 2021-02-27

## 2021-02-27 RX ORDER — LANOLIN
1 OINTMENT (GRAM) TOPICAL EVERY 6 HOURS
Refills: 0 | Status: DISCONTINUED | OUTPATIENT
Start: 2021-02-27 | End: 2021-03-01

## 2021-02-27 RX ORDER — IBUPROFEN 200 MG
600 TABLET ORAL EVERY 6 HOURS
Refills: 0 | Status: COMPLETED | OUTPATIENT
Start: 2021-02-27 | End: 2022-01-26

## 2021-02-27 RX ORDER — KETOROLAC TROMETHAMINE 30 MG/ML
30 SYRINGE (ML) INJECTION ONCE
Refills: 0 | Status: DISCONTINUED | OUTPATIENT
Start: 2021-02-27 | End: 2021-02-27

## 2021-02-27 RX ORDER — ACETAMINOPHEN 500 MG
975 TABLET ORAL
Refills: 0 | Status: DISCONTINUED | OUTPATIENT
Start: 2021-02-27 | End: 2021-03-01

## 2021-02-27 RX ORDER — DIBUCAINE 1 %
1 OINTMENT (GRAM) RECTAL EVERY 6 HOURS
Refills: 0 | Status: DISCONTINUED | OUTPATIENT
Start: 2021-02-27 | End: 2021-03-01

## 2021-02-27 RX ORDER — BENZOCAINE 10 %
1 GEL (GRAM) MUCOUS MEMBRANE EVERY 6 HOURS
Refills: 0 | Status: DISCONTINUED | OUTPATIENT
Start: 2021-02-27 | End: 2021-03-01

## 2021-02-27 RX ORDER — SODIUM CHLORIDE 9 MG/ML
3 INJECTION INTRAMUSCULAR; INTRAVENOUS; SUBCUTANEOUS EVERY 8 HOURS
Refills: 0 | Status: DISCONTINUED | OUTPATIENT
Start: 2021-02-27 | End: 2021-03-01

## 2021-02-27 RX ORDER — MAGNESIUM HYDROXIDE 400 MG/1
30 TABLET, CHEWABLE ORAL
Refills: 0 | Status: DISCONTINUED | OUTPATIENT
Start: 2021-02-27 | End: 2021-03-01

## 2021-02-27 RX ADMIN — Medication 1000 MILLIUNIT(S)/MIN: at 21:28

## 2021-02-27 RX ADMIN — Medication 30 MILLIGRAM(S): at 20:30

## 2021-02-27 RX ADMIN — SODIUM CHLORIDE 125 MILLILITER(S): 9 INJECTION, SOLUTION INTRAVENOUS at 18:52

## 2021-02-27 NOTE — OB PROVIDER H&P - NSHPPHYSICALEXAM_GEN_ALL_CORE
Vital Signs Last 24 Hrs  HR: 79 (27 Feb 2021 17:43) (79 - 79)  BP: 116/68 (27 Feb 2021 17:43) (116/68 - 116/68)    Gen: NAD, sitting comfortably  Abd: Gravid, soft, NT, strongly palpable ctx  SVE: 7/90/-1, vtx, intact  EFM: 135/mod/no accel  [place]: q2-3m

## 2021-02-27 NOTE — OB PROVIDER H&P - NSRUBEOLASOURCE_OBGYN_ALL_OB
"Namrata Barros's chief complaint for this visit includes:  Chief Complaint   Patient presents with     Consult     bilateral hip pain, no known injury, pain for months, currently in PT      PCP: Sophie Love    Referring Provider:  No referring provider defined for this encounter.    Ht 1.57 m (5' 1.81\")   Wt 52.5 kg (115 lb 12.8 oz)   BMI 21.31 kg/m    Data Unavailable     Do you need any medication refills at today's visit? No       " hard copy, drawn during this pregnancy Lab Facility: [id number] Lab Facility: [id number]

## 2021-02-27 NOTE — OB PROVIDER H&P - ASSESSMENT
24yo  @39w0d, GBS neg, in labor  -admit to L&D  -cont efm/toco  -clear liquid diet, IVF  -pain management with epidural  -vitals per routine  -f/u admission labs, COVID swab    Dr. Phillips and Dr. Dowd aware.

## 2021-02-27 NOTE — OB PROVIDER DELIVERY SUMMARY - NSPROVIDERDELIVERYNOTE_OBGYN_ALL_OB_FT
Patient was fully dilated and pushing. Fetal head was OA and restituted to ROT. The anterior and posterior shoulders delivered, followed by the remaining body atraumatically. Delayed cord clamping was performed, and then clamped and cut. Cord blood gases collected x2. The  was handed to the mother. The placenta delivered intact with membranes. Pitocin was administered. Uterus massaged, fundus found to be firm. Cervix, vagina and perineum inspected. First degree laceration and periurethral tear were noted, repaired using 3-0 chromic in the usual fashion with good hemostasis.     Viable female infant delivered, weighing 6-12 lbs, with APGARs 9/9    Laceration: first degree laceration and periurethral tear  EBL 300cc

## 2021-02-27 NOTE — OB PROVIDER H&P - NS_OBGYNHISTORY_OBGYN_ALL_OB_FT
OB: 39wk  x1, 5-7, SGA, no other complications    GYN: Denies fibroids, cysts, abnormal pap smears, STDs.

## 2021-02-27 NOTE — OB PROVIDER H&P - HISTORY OF PRESENT ILLNESS
26yo  @39w0d with LISSETH 3/6 by first trimester sonogram presenting to L&D for contractions q2-3m since 1200 today, desires epidural for pain management.  Denies vaginal bleeding or leakage of fluid.  Good fetal movement.  Denies any complications with this pregnancy.  GBS neg.

## 2021-02-28 ENCOUNTER — TRANSCRIPTION ENCOUNTER (OUTPATIENT)
Age: 26
End: 2021-02-28

## 2021-02-28 LAB
BASOPHILS # BLD AUTO: 0.02 K/UL — SIGNIFICANT CHANGE UP (ref 0–0.2)
BASOPHILS NFR BLD AUTO: 0.2 % — SIGNIFICANT CHANGE UP (ref 0–1)
EOSINOPHIL # BLD AUTO: 0.36 K/UL — SIGNIFICANT CHANGE UP (ref 0–0.7)
EOSINOPHIL NFR BLD AUTO: 4.2 % — SIGNIFICANT CHANGE UP (ref 0–8)
HCT VFR BLD CALC: 31.7 % — LOW (ref 37–47)
HGB BLD-MCNC: 10.5 G/DL — LOW (ref 12–16)
IMM GRANULOCYTES NFR BLD AUTO: 0.7 % — HIGH (ref 0.1–0.3)
LYMPHOCYTES # BLD AUTO: 1.72 K/UL — SIGNIFICANT CHANGE UP (ref 1.2–3.4)
LYMPHOCYTES # BLD AUTO: 19.9 % — LOW (ref 20.5–51.1)
MCHC RBC-ENTMCNC: 29.7 PG — SIGNIFICANT CHANGE UP (ref 27–31)
MCHC RBC-ENTMCNC: 33.1 G/DL — SIGNIFICANT CHANGE UP (ref 32–37)
MCV RBC AUTO: 89.8 FL — SIGNIFICANT CHANGE UP (ref 81–99)
MONOCYTES # BLD AUTO: 0.92 K/UL — HIGH (ref 0.1–0.6)
MONOCYTES NFR BLD AUTO: 10.6 % — HIGH (ref 1.7–9.3)
NEUTROPHILS # BLD AUTO: 5.57 K/UL — SIGNIFICANT CHANGE UP (ref 1.4–6.5)
NEUTROPHILS NFR BLD AUTO: 64.4 % — SIGNIFICANT CHANGE UP (ref 42.2–75.2)
NRBC # BLD: 0 /100 WBCS — SIGNIFICANT CHANGE UP (ref 0–0)
PLATELET # BLD AUTO: 156 K/UL — SIGNIFICANT CHANGE UP (ref 130–400)
RBC # BLD: 3.53 M/UL — LOW (ref 4.2–5.4)
RBC # FLD: 13.2 % — SIGNIFICANT CHANGE UP (ref 11.5–14.5)
WBC # BLD: 8.65 K/UL — SIGNIFICANT CHANGE UP (ref 4.8–10.8)
WBC # FLD AUTO: 8.65 K/UL — SIGNIFICANT CHANGE UP (ref 4.8–10.8)

## 2021-02-28 RX ORDER — IBUPROFEN 200 MG
1 TABLET ORAL
Qty: 0 | Refills: 0 | DISCHARGE
Start: 2021-02-28

## 2021-02-28 RX ORDER — ACETAMINOPHEN 500 MG
3 TABLET ORAL
Qty: 0 | Refills: 0 | DISCHARGE
Start: 2021-02-28

## 2021-02-28 RX ORDER — IBUPROFEN 200 MG
600 TABLET ORAL EVERY 6 HOURS
Refills: 0 | Status: DISCONTINUED | OUTPATIENT
Start: 2021-02-28 | End: 2021-03-01

## 2021-02-28 RX ADMIN — Medication 975 MILLIGRAM(S): at 08:08

## 2021-02-28 RX ADMIN — Medication 975 MILLIGRAM(S): at 21:23

## 2021-02-28 RX ADMIN — Medication 975 MILLIGRAM(S): at 00:31

## 2021-02-28 RX ADMIN — Medication 600 MILLIGRAM(S): at 11:48

## 2021-02-28 RX ADMIN — Medication 1 TABLET(S): at 11:48

## 2021-02-28 RX ADMIN — SODIUM CHLORIDE 3 MILLILITER(S): 9 INJECTION INTRAMUSCULAR; INTRAVENOUS; SUBCUTANEOUS at 04:59

## 2021-02-28 RX ADMIN — SODIUM CHLORIDE 3 MILLILITER(S): 9 INJECTION INTRAMUSCULAR; INTRAVENOUS; SUBCUTANEOUS at 17:07

## 2021-02-28 RX ADMIN — Medication 600 MILLIGRAM(S): at 05:08

## 2021-02-28 RX ADMIN — Medication 600 MILLIGRAM(S): at 17:24

## 2021-02-28 RX ADMIN — SODIUM CHLORIDE 3 MILLILITER(S): 9 INJECTION INTRAMUSCULAR; INTRAVENOUS; SUBCUTANEOUS at 21:23

## 2021-02-28 NOTE — DISCHARGE NOTE OB - PATIENT PORTAL LINK FT
You can access the FollowMyHealth Patient Portal offered by [facility] by registering at the following website: [email or web address]. By joining [employer]’s FollowMyHealth portal, you will also be able to view your health information using other applications (apps) compatible with our system.

## 2021-02-28 NOTE — DISCHARGE NOTE OB - CARE PROVIDER_API CALL
Nick Patel)  Obstetrics and Gynecology  [address] [place] [address]  Phone: [phone]  Fax: [phone]  Follow Up Time:

## 2021-02-28 NOTE — DISCHARGE NOTE OB - HOSPITAL COURSE
DATE OF DISCHARGE: 21 @ 10:19    HISTORY OF PRESENT ILLNESS/HOSPITAL COURSE: HPI:  26yo  @39w0d with LISSETH 3/6 by first trimester sonogram presenting to L&D for contractions q2-3m since 1200 today, desires epidural for pain management.  Denies vaginal bleeding or leakage of fluid.  Good fetal movement.  Denies any complications with this pregnancy.  GBS neg. (2021 17:41)    PAST MEDICAL & SURGICAL HISTORY:  No pertinent past medical history    No significant past surgical history        PROCEDURES PERFORMED: vaginal delivery    COMPLICATIONS:  none    POST PARTUM COURSE: uncomplicated       FINAL DIAGNOSIS:  normal vaginal delivery    LABS:                       10.5   8.65  )-----------( 156      ( 2021 05:43 )             31.7

## 2021-03-01 VITALS
TEMPERATURE: 97 F | HEART RATE: 59 BPM | DIASTOLIC BLOOD PRESSURE: 59 MMHG | SYSTOLIC BLOOD PRESSURE: 100 MMHG | RESPIRATION RATE: 18 BRPM

## 2021-03-01 LAB
AMPHET UR-MCNC: NEGATIVE — SIGNIFICANT CHANGE UP
BARBITURATES UR SCN-MCNC: NEGATIVE — SIGNIFICANT CHANGE UP
BENZODIAZ UR-MCNC: NEGATIVE — SIGNIFICANT CHANGE UP
BUPRENORPHINE SCREEN, URINE RESULT: NEGATIVE — SIGNIFICANT CHANGE UP
COCAINE METAB.OTHER UR-MCNC: NEGATIVE — SIGNIFICANT CHANGE UP
FENTANYL UR QL: NEGATIVE — SIGNIFICANT CHANGE UP
METHADONE UR-MCNC: NEGATIVE — SIGNIFICANT CHANGE UP
OPIATES UR-MCNC: NEGATIVE — SIGNIFICANT CHANGE UP
OXYCODONE UR-MCNC: NEGATIVE — SIGNIFICANT CHANGE UP
PCP UR-MCNC: NEGATIVE — SIGNIFICANT CHANGE UP
PROPOXYPHENE QUALITATIVE URINE RESULT: NEGATIVE — SIGNIFICANT CHANGE UP
SARS-COV-2 IGG SERPL QL IA: NEGATIVE — SIGNIFICANT CHANGE UP
SARS-COV-2 IGM SERPL IA-ACNC: 0.07 INDEX — SIGNIFICANT CHANGE UP

## 2021-03-01 RX ADMIN — Medication 600 MILLIGRAM(S): at 00:11

## 2021-03-01 RX ADMIN — Medication 600 MILLIGRAM(S): at 06:16

## 2021-03-01 RX ADMIN — Medication 975 MILLIGRAM(S): at 08:30

## 2021-03-01 NOTE — PROGRESS NOTE ADULT - SUBJECTIVE AND OBJECTIVE BOX
OB attending  PPD #2    Pt doing well, pain well controlled. No overnight events, no acute complaints.    Ambulating: Yes  Voiding: Yes  Flatus: Yes  Bowel movements: Yes   Breast or bottle feeding: Breastfeeding  Diet: Regular    PAST MEDICAL & SURGICAL HISTORY:  No pertinent past medical history    No significant past surgical history        Physical Exam  Vital Signs Last 24 Hrs  T(C): 36 (2021 23:24), Max: 36.3 (2021 08:40)  T(F): 96.8 (2021 23:24), Max: 97.3 (2021 08:40)  HR: 71 (2021 23:24) (69 - 82)  BP: 104/56 (2021 23:24) (92/55 - 113/57)  BP(mean): --  RR: 18 (2021 23:24) (18 - 20)  SpO2: --  Gen: AAOx3, NAD  Abd: Soft, nontender, nondistended, BS+  Fundus: Firm, below umbilicus  Lochia: normal  Ext: No calf tenderness, no swelling    Labs:                        10.5   8.65  )-----------( 156      ( 2021 05:43 )             31.7         A/P: s/p , PPD #2, doing well  - d/c home
OB attending  PPD #1    Pt doing well, pain well controlled. No overnight events, no acute complaints.    Ambulating: Yes  Voiding: Yes  Flatus: Yes  Bowel movements: Yes   Breast or bottle feeding: Breastfeeding  Diet: Regular    PAST MEDICAL & SURGICAL HISTORY:  No pertinent past medical history    No significant past surgical history        Physical Exam  Vital Signs Last 24 Hrs  T(C): 36.3 (2021 08:40), Max: 36.7 (2021 23:55)  T(F): 97.3 (2021 08:40), Max: 98 (2021 23:55)  HR: 82 (2021 08:40) (61 - 91)  BP: 92/55 (2021 08:40) (92/55 - 116/68)  BP(mean): --  RR: 18 (2021 08:40) (18 - 20)  SpO2: 98% (2021 22:09) (94% - 100%)  Gen: AAOx3, NAD  Abd: Soft, nontender, nondistended, BS+  Fundus: Firm, below umbilicus  Lochia: normal  Ext: No calf tenderness, no swelling    Labs:                        10.5   8.65  )-----------( 156      ( 2021 05:43 )             31.7         A/P:24 yo P2, s/p , PPD #1, doing well  - continue current management

## 2021-03-03 DIAGNOSIS — Z3A.39 39 WEEKS GESTATION OF PREGNANCY: ICD-10-CM

## 2021-03-03 DIAGNOSIS — Z82.49 FAMILY HISTORY OF ISCHEMIC HEART DISEASE AND OTHER DISEASES OF THE CIRCULATORY SYSTEM: ICD-10-CM

## 2021-03-03 DIAGNOSIS — Z83.2 FAMILY HISTORY OF DISEASES OF THE BLOOD AND BLOOD-FORMING ORGANS AND CERTAIN DISORDERS INVOLVING THE IMMUNE MECHANISM: ICD-10-CM

## 2021-04-04 ENCOUNTER — FORM ENCOUNTER (OUTPATIENT)
Age: 26
End: 2021-04-04

## 2021-04-28 ENCOUNTER — FORM ENCOUNTER (OUTPATIENT)
Age: 26
End: 2021-04-28

## 2021-05-23 ENCOUNTER — FORM ENCOUNTER (OUTPATIENT)
Age: 26
End: 2021-05-23

## 2021-05-24 VITALS — WEIGHT: 122 LBS | HEIGHT: 64 IN | BODY MASS INDEX: 20.83 KG/M2

## 2021-07-03 NOTE — OB RN PATIENT PROFILE - NSNUTRITIONRISK_GI_A_CORE
Addendum Note by Lombardi, Susan L, RN at 2/20/2020 10:00 AM     Author: Lombardi, Susan L, RN Service: -- Author Type: RN, Care Manager    Filed: 2/20/2020  1:39 PM Date of Service: 2/20/2020 10:00 AM Status: Signed    : Lombardi, Susan L, RN (RN, Care Manager)    Encounter addended by: Lombardi, Susan L, RN on: 2/20/2020  1:39 PM      Actions taken: Clinical Note Signed, Charge Capture section accepted       No indicators present

## 2022-11-07 PROBLEM — Z78.9 OTHER SPECIFIED HEALTH STATUS: Chronic | Status: ACTIVE | Noted: 2021-02-27

## 2022-11-08 ENCOUNTER — NON-APPOINTMENT (OUTPATIENT)
Age: 27
End: 2022-11-08

## 2022-11-08 DIAGNOSIS — Z78.9 OTHER SPECIFIED HEALTH STATUS: ICD-10-CM

## 2022-11-08 DIAGNOSIS — Z98.890 OTHER SPECIFIED POSTPROCEDURAL STATES: ICD-10-CM

## 2022-11-08 DIAGNOSIS — N93.8 OTHER SPECIFIED ABNORMAL UTERINE AND VAGINAL BLEEDING: ICD-10-CM

## 2022-11-08 DIAGNOSIS — Z82.49 FAMILY HISTORY OF ISCHEMIC HEART DISEASE AND OTHER DISEASES OF THE CIRCULATORY SYSTEM: ICD-10-CM

## 2022-11-08 RX ORDER — NORGESTREL AND ETHINYL ESTRADIOL 0.3-0.03MG
0.3-3 KIT ORAL
Refills: 0 | Status: ACTIVE | COMMUNITY

## 2022-11-10 ENCOUNTER — APPOINTMENT (OUTPATIENT)
Dept: OBGYN | Facility: CLINIC | Age: 27
End: 2022-11-10
Payer: COMMERCIAL

## 2022-11-10 VITALS
DIASTOLIC BLOOD PRESSURE: 68 MMHG | HEIGHT: 64 IN | BODY MASS INDEX: 19.97 KG/M2 | SYSTOLIC BLOOD PRESSURE: 98 MMHG | WEIGHT: 117 LBS

## 2022-11-10 DIAGNOSIS — Z00.00 ENCOUNTER FOR GENERAL ADULT MEDICAL EXAMINATION W/OUT ABNORMAL FINDINGS: ICD-10-CM

## 2022-11-10 LAB
BILIRUB UR QL STRIP: NORMAL
GLUCOSE UR-MCNC: NORMAL
HCG UR QL: 0.2 EU/DL
HCG UR QL: NEGATIVE
HGB UR QL STRIP.AUTO: NORMAL
KETONES UR-MCNC: NORMAL
LEUKOCYTE ESTERASE UR QL STRIP: NORMAL
NITRITE UR QL STRIP: NORMAL
PH UR STRIP: 5.5
PROT UR STRIP-MCNC: NORMAL
SP GR UR STRIP: 1.03

## 2022-11-10 PROCEDURE — 81025 URINE PREGNANCY TEST: CPT

## 2022-11-10 PROCEDURE — 81003 URINALYSIS AUTO W/O SCOPE: CPT | Mod: QW

## 2022-11-10 PROCEDURE — 99395 PREV VISIT EST AGE 18-39: CPT

## 2022-11-10 NOTE — HISTORY OF PRESENT ILLNESS
[Patient reported PAP Smear was normal] : Patient reported PAP Smear was normal [Gonorrhea test offered] : Gonorrhea test offered [Chlamydia test offered] : Chlamydia test offered [FreeTextEntry1] : 26yo  here for annual. no compalitns. no BC desires pregnancy\par \par pmh denies\par psh denies\par meds none\par all nkda\par \par obhx: nsd x 2\par gynhx unremarkable [PapSmeardate] : 2021

## 2022-11-13 LAB
C TRACH RRNA SPEC QL NAA+PROBE: NOT DETECTED
N GONORRHOEA RRNA SPEC QL NAA+PROBE: NOT DETECTED
SOURCE AMPLIFICATION: NORMAL

## 2023-01-11 ENCOUNTER — APPOINTMENT (OUTPATIENT)
Dept: OBGYN | Facility: CLINIC | Age: 28
End: 2023-01-11

## 2023-10-16 NOTE — OB PROVIDER H&P - NSPRIMARYCAREPROV_OBGYN_ALL_OB
Client did not sign in for her intake appointment. Writer called and left her a message noting that she will be engaging in a 4 course training with therapist Jennifer Jurado in November. Writer encouraged her if she thought that would help please proceed. Writer explained goal of therapy with her - skill building before the end of the year. Client provided contact number for writer if she determines that therapy would be helpful at this time.  April Alvarez LPC, CSAC, ICS MD//MARTIR/CLARISSE

## 2023-12-07 NOTE — OB PROVIDER H&P - BLOOD TRANSFUSION, PREVIOUS, PROFILE
Please call patient.  Renal function panel may have been ordered by her nephrologist.  I was wanting to reassess her kidney function.  Appears that her kidney function is stable at 1.3, mildly elevated.  Continue current medications.  Blood sugar elevated at 145 and I will defer to her PCP and/or her endocrinologist.  Please keep follow-up appointment with Dr. Johan Bunch in September 2024.  Thank you.
no

## 2024-04-20 NOTE — OB PROVIDER H&P - HISTORY OF PRESENT ILLNESS
Normal 22yo  @39w3d with LISSETH  by LMP 3/20 consistent with first trimester sonogram here for contractions since 300, now q3-4m apart, desires pain management with epidural.  Denies LOF but has noticed some mucous leakage.  With scant vaginal spotting, no kristofer vaginal bleeding.  Good FM.  H/o anemia on PO iron, last H/h on  was 11.3/34.  Denies dizziness, SOB, palpitations.  Family history of provoked DVT in mother who was pregnant during a long flight, clotting studies all wnl.  Fetus is IUGR, growth sono on  shows EFW <10%.  Pos GCT, neg GTT. Denies any other complications during this pregnancy.  GBS neg.

## 2025-06-11 NOTE — DISCHARGE NOTE OB - BREASTFEEDING PROVIDES STABLE TEMPERATURE THROUGH SKIN TO SKIN CONTACT
POSTPROCEDURE FOLLOW UP CALL    PROCEDURE & LEVELS: BILATERAL SACROILIAC JOINT INJECTION THORACIC TPI [[id number]]     DATE OF PROCEDURE: 6/10/2025     ANY CONCERNS POST PROCEDURE (INCLUDING RED FLAG SYMPTOMS):Patient states she is doing well.       FOLLOW UP VISIT:  Follow up visit in clinic scheduled with Dr. Bailon on 7/9/25.   
Statement Selected